# Patient Record
Sex: MALE | Race: WHITE | NOT HISPANIC OR LATINO | Employment: OTHER | URBAN - METROPOLITAN AREA
[De-identification: names, ages, dates, MRNs, and addresses within clinical notes are randomized per-mention and may not be internally consistent; named-entity substitution may affect disease eponyms.]

---

## 2017-06-30 ENCOUNTER — OFFICE VISIT (OUTPATIENT)
Dept: CARDIOLOGY | Facility: CLINIC | Age: 72
End: 2017-06-30

## 2017-06-30 VITALS
HEART RATE: 51 BPM | BODY MASS INDEX: 28 KG/M2 | DIASTOLIC BLOOD PRESSURE: 88 MMHG | SYSTOLIC BLOOD PRESSURE: 124 MMHG | WEIGHT: 200 LBS | HEIGHT: 71 IN

## 2017-06-30 DIAGNOSIS — Z95.2 S/P AORTIC VALVE REPLACEMENT: Primary | ICD-10-CM

## 2017-06-30 PROCEDURE — 99213 OFFICE O/P EST LOW 20 MIN: CPT | Performed by: INTERNAL MEDICINE

## 2017-06-30 PROCEDURE — 93000 ELECTROCARDIOGRAM COMPLETE: CPT | Performed by: INTERNAL MEDICINE

## 2018-07-09 ENCOUNTER — OFFICE VISIT (OUTPATIENT)
Dept: CARDIOLOGY | Facility: CLINIC | Age: 73
End: 2018-07-09

## 2018-07-09 VITALS
BODY MASS INDEX: 28 KG/M2 | SYSTOLIC BLOOD PRESSURE: 120 MMHG | WEIGHT: 200 LBS | DIASTOLIC BLOOD PRESSURE: 90 MMHG | HEART RATE: 55 BPM | HEIGHT: 71 IN

## 2018-07-09 DIAGNOSIS — Z95.2 S/P AORTIC VALVE REPLACEMENT: Primary | ICD-10-CM

## 2018-07-09 DIAGNOSIS — I33.0 BACTERIAL ENDOCARDITIS, UNSPECIFIED CHRONICITY: ICD-10-CM

## 2018-07-09 PROCEDURE — 93000 ELECTROCARDIOGRAM COMPLETE: CPT | Performed by: INTERNAL MEDICINE

## 2018-07-09 PROCEDURE — 99214 OFFICE O/P EST MOD 30 MIN: CPT | Performed by: INTERNAL MEDICINE

## 2018-07-09 NOTE — PROGRESS NOTES
Date of Office Visit: 2017  Encounter Provider: Yanick Reza MD  Place of Service: [facility] CARDIOLOGY  Patient Name: Kem Giraldo  :1945  [id number]    Chief Complaint   Patient presents with   • Cardiac Valve Problem   :     HPI: Kem Giraldo is a 72 y.o. male  this is a cayden that got endocarditis on his aortic valve from olecranon bursitis in  he is a #25 porcine aortic valve replacement by Dr. Haley no coronary disease preoperatively normal LV function.  He is an avid  and exerciser and still feels great.  He has no shortness of breath, paroxysmal nocturnal dyspnea, orthopnea, or edema.  Sadly, his wife has become quite demented and is now bed-bound and he is the primary caregiver for her but, otherwise, there has been no significant change in his status.          Past Medical History:   Diagnosis Date   • Acute renal failure (CMS/HCC)    • Acute respiratory failure (CMS/HCC)    • Adult respiratory distress syndrome (CMS/HCC)    • Aortic valve endocarditis      from elbow bursitis   • Endocarditis of mitral valve    • Esophageal reflux    • Fasciitis     right arm   • Hyperlipidemia    • Hypertension    • Hypothyroidism    • Olecranon bursitis     Right   • Osteoarthritis    • Pericarditis    • Postop septic pulmonary embolism (CMS/HCC)     sepsis from traumatic olecranon bursitis, spread to inner arm fasciitis and spinal cord abcess   • Pyelonephritis        Past Surgical History:   Procedure Laterality Date   • AORTIC VALVE REPAIR/REPLACEMENT      #25 Porcine AVR by Dr Haley at time of BE; normal cors pre-op   • CREATION PERICARDIAL WINDOW     • DECOMPRESSION FASCIOTOMY FOREARM     • GTUBE INSERTION     • INCISION AND DRAINAGE ABSCESS POSTERIOR THORACIC SPINE         Social History     Social History   • Marital status:      Spouse name: N/A   • Number of children: N/A   • Years of education: N/A     Occupational History   • Not on file.      Social History Main Topics   • Smoking status: Never Smoker   • Smokeless tobacco: Not on file      Comment: caffeine use   • Alcohol use Yes   • Drug use: Unknown   • Sexual activity: Not on file     Other Topics Concern   • Not on file     Social History Narrative   • No narrative on file       No family history on file.    Review of Systems   Constitution: Negative for decreased appetite, fever, malaise/fatigue and weight loss.   HENT: Negative for nosebleeds.    Eyes: Negative for double vision.   Cardiovascular: Negative for chest pain, claudication, cyanosis, dyspnea on exertion, irregular heartbeat, leg swelling, near-syncope, orthopnea, palpitations, paroxysmal nocturnal dyspnea and syncope.   Respiratory: Negative for cough, hemoptysis and shortness of breath.    Hematologic/Lymphatic: Negative for bleeding problem.   Skin: Negative for rash.   Musculoskeletal: Negative for falls and myalgias.   Gastrointestinal: Negative for hematochezia, jaundice, melena, nausea and vomiting.   Genitourinary: Negative for hematuria.   Neurological: Negative for dizziness and seizures.   Psychiatric/Behavioral: Negative for altered mental status and memory loss.       No Known Allergies      Current Outpatient Prescriptions:   •  Ascorbic Acid (VITAMIN C PO), Take by mouth., Disp: , Rfl:   •  aspirin 81 MG tablet, Take 1 tablet by mouth daily., Disp: , Rfl:   •  Cetirizine HCl (ZYRTEC PO), Take by mouth., Disp: , Rfl:   •  chlorthalidone (HYGROTEN) 25 MG tablet, Take 1 tablet by mouth daily., Disp: , Rfl:   •  levothyroxine (SYNTHROID, LEVOTHROID) 125 MCG tablet, Take 125 mcg by mouth daily., Disp: , Rfl:   •  Lutein 6 MG tablet, Take by mouth., Disp: , Rfl:   •  metoprolol tartrate (LOPRESSOR) 50 MG tablet, Take 1 tablet by mouth 2 (two) times a day., Disp: , Rfl:   •  Omeprazole 20 MG tablet delayed-release, Take 1 tablet by mouth daily., Disp: , Rfl:   •  simvastatin (ZOCOR) 40 MG tablet, Take 1 tablet by mouth  "daily., Disp: , Rfl:      Objective:     Vitals:    07/09/18 1055   BP: 120/90   Pulse: 55   Weight: 90.7 kg (200 lb)   Height: 180.3 cm (71\")     Body mass index is 27.89 kg/m².    Physical Exam   Constitutional: He is oriented to person, place, and time. He appears well-developed and well-nourished.   HENT:   Head: Normocephalic.   Eyes: No scleral icterus.   Neck: No JVD present. No thyromegaly present.   Cardiovascular: Normal rate and regular rhythm.  Exam reveals no gallop and no friction rub.    Murmur heard.   Harsh early systolic murmur is present with a grade of 1/6  at the upper right sternal border radiating to the neck  Pulmonary/Chest: Effort normal and breath sounds normal. He has no wheezes. He has no rales.       Abdominal: Soft. There is no hepatosplenomegaly. There is no tenderness.   Musculoskeletal: Normal range of motion. He exhibits no edema.   Lymphadenopathy:     He has no cervical adenopathy.   Neurological: He is alert and oriented to person, place, and time.   Skin: Skin is warm and dry. No rash noted.   Psychiatric: He has a normal mood and affect.         ECG 12 Lead  Date/Time: 7/9/2018 11:46 AM  Performed by: NOEL CHAVEZ  Authorized by: NOEL CHAVEZ   Comparison: compared with previous ECG   Similar to previous ECG  Rhythm: sinus rhythm  Clinical impression: normal ECG             Assessment:       Diagnosis Plan   1. S/P aortic valve replacement            Plan:       This is a gentleman that had an unfortunate complication from olecranon bursitis that led to endocarditis of his aortic valve.  In 2009, Dr. Chris Haley replaced his valve.  He had normal coronaries and normal left ventricular function.  He has done very well since then.  It is a porcine number 25 aortic valve.  In 2016, we did an echocardiogram on his heart and his valve looked great.  His left ventricle looked normal.  He is not having any symptoms now.  I am not going to make any changes.  I am going to have " him come and see GABBY Nicholson in a year and see me in two years.  In two years, for sure, we should get an echocardiogram on him.  If he were to develop any symptoms, we would do something sooner.         As always, it has been a pleasure to participate in your patient's care.      Sincerely,       Yanick Reza MD

## 2019-07-16 ENCOUNTER — OFFICE VISIT (OUTPATIENT)
Dept: CARDIOLOGY | Facility: CLINIC | Age: 74
End: 2019-07-16

## 2019-07-16 VITALS
HEART RATE: 60 BPM | DIASTOLIC BLOOD PRESSURE: 76 MMHG | SYSTOLIC BLOOD PRESSURE: 140 MMHG | OXYGEN SATURATION: 99 % | BODY MASS INDEX: 29.4 KG/M2 | HEIGHT: 71 IN | WEIGHT: 210 LBS

## 2019-07-16 DIAGNOSIS — Z95.2 S/P AORTIC VALVE REPLACEMENT: Primary | ICD-10-CM

## 2019-07-16 PROCEDURE — 99213 OFFICE O/P EST LOW 20 MIN: CPT | Performed by: NURSE PRACTITIONER

## 2019-07-16 PROCEDURE — 93000 ELECTROCARDIOGRAM COMPLETE: CPT | Performed by: NURSE PRACTITIONER

## 2019-07-16 RX ORDER — POTASSIUM CHLORIDE 1500 MG/1
1 TABLET, EXTENDED RELEASE ORAL DAILY
COMMUNITY
Start: 2019-07-10

## 2019-07-16 NOTE — PROGRESS NOTES
Date of Office Visit: 2019  Encounter Provider: CALISTA Evans  Place of Service: [facility] CARDIOLOGY  Patient Name: Kem Giraldo  :1945    Chief Complaint   Patient presents with   • S/P aortic valve replacement     1 yr f/u   :     HPI: Kem Giraldo is a 73 y.o. male, new to me, who presents today for follow-up.  Old records have been obtained and reviewed by me.  He is a patient of Dr. Fermin with a past cardiac history significant for hypertension, hyperlipidemia, and aortic valve replacement.  In , he had endocarditis of the aortic valve from olecranon bursitis.  He has no history of coronary disease and has normal LV function.  His last echocardiogram was in 2016 which revealed normal LV function and an estimated EF of 59%.  He was last seen in the office on 2018 at which time he was doing well with no complaints of angina or heart failure.  No changes were made to his medical regimen and he was advised to follow-up in 1 year.   Over the past year he has been doing well from a cardiac standpoint.  He denies any chest pain, shortness of air, palpitations, edema, dizziness, or syncope.  Evidently he was experiencing a little bit of fatigue and shortness of breath recently.  His PCP discovered that he had low potassium and started him on potassium replacement.  His symptoms have all resolved.  He works out 3 days a week at his home gym and walks twice a week with friends.  He is able to do these activities without any difficulty.  Sadly he lost his wife about 2 months ago after a long nix with dementia.    Past Medical History:   Diagnosis Date   • Acute renal failure (CMS/HCC)    • Acute respiratory failure (CMS/HCC)    • Adult respiratory distress syndrome (CMS/HCC)    • Aortic valve endocarditis      from elbow bursitis   • Endocarditis of mitral valve    • Esophageal reflux    • Fasciitis     right arm   • Hyperlipidemia    •  Hypertension    • Hypothyroidism    • Olecranon bursitis     Right   • Osteoarthritis    • Pericarditis    • Postop septic pulmonary embolism (CMS/HCC)     sepsis from traumatic olecranon bursitis, spread to inner arm fasciitis and spinal cord abcess   • Pyelonephritis        Past Surgical History:   Procedure Laterality Date   • AORTIC VALVE REPAIR/REPLACEMENT      #25 Porcine AVR by Dr Haley at time of BE; normal cors pre-op   • CREATION PERICARDIAL WINDOW     • DECOMPRESSION FASCIOTOMY FOREARM     • GTUBE INSERTION     • INCISION AND DRAINAGE ABSCESS POSTERIOR THORACIC SPINE         Social History     Socioeconomic History   • Marital status:      Spouse name: Not on file   • Number of children: Not on file   • Years of education: Not on file   • Highest education level: Not on file   Tobacco Use   • Smoking status: Never Smoker   • Smokeless tobacco: Never Used   • Tobacco comment: caffeine use   Substance and Sexual Activity   • Alcohol use: Yes     Alcohol/week: 0.6 oz     Types: 1 Cans of beer per week     Comment: social    • Sexual activity: Not Currently     Comment:        Family History   Problem Relation Age of Onset   • Hypertension Mother    • Hyperlipidemia Mother    • Hypertension Father    • Hyperlipidemia Father    • Cancer Father        Review of Systems   Constitution: Negative for chills, fever and malaise/fatigue.   Cardiovascular: Negative for chest pain, dyspnea on exertion, leg swelling, near-syncope, orthopnea, palpitations, paroxysmal nocturnal dyspnea and syncope.   Respiratory: Negative for cough and shortness of breath.    Musculoskeletal: Negative for joint pain, joint swelling and myalgias.   Gastrointestinal: Negative for abdominal pain, diarrhea, melena, nausea and vomiting.   Genitourinary: Negative for frequency and hematuria.   Neurological: Negative for light-headedness, numbness, paresthesias and seizures.   Allergic/Immunologic: Negative.    All other systems  "reviewed and are negative.      No Known Allergies      Current Outpatient Medications:   •  Ascorbic Acid (VITAMIN C PO), Take 1 tablet by mouth Daily., Disp: , Rfl:   •  aspirin 81 MG tablet, Take 1 tablet by mouth daily., Disp: , Rfl:   •  Cetirizine HCl (ZYRTEC PO), Take 1 tablet by mouth Daily., Disp: , Rfl:   •  chlorthalidone (HYGROTEN) 25 MG tablet, Take 1 tablet by mouth daily., Disp: , Rfl:   •  KLOR-CON 20 MEQ CR tablet, Take 1 tablet by mouth Daily., Disp: , Rfl:   •  levothyroxine (SYNTHROID, LEVOTHROID) 125 MCG tablet, Take 125 mcg by mouth daily., Disp: , Rfl:   •  Lutein 6 MG tablet, Take 1 tablet by mouth Daily., Disp: , Rfl:   •  metoprolol tartrate (LOPRESSOR) 50 MG tablet, Take 1 tablet by mouth Daily., Disp: , Rfl:   •  Omeprazole 20 MG tablet delayed-release, Take 1 tablet by mouth daily., Disp: , Rfl:       Objective:     Vitals:    07/16/19 1149 07/16/19 1150   BP: 142/86 140/76   BP Location: Right arm Left arm   Pulse: 60    SpO2: 99%    Weight: 95.3 kg (210 lb)    Height: 180.3 cm (71\")      Body mass index is 29.29 kg/m².    PHYSICAL EXAM:    Physical Exam   Constitutional: He is oriented to person, place, and time. He appears well-developed and well-nourished. No distress.   HENT:   Head: Normocephalic and atraumatic.   Eyes: Pupils are equal, round, and reactive to light.   Neck: No JVD present. No thyromegaly present.   Cardiovascular: Normal rate, regular rhythm, normal heart sounds and intact distal pulses.   No murmur heard.  Pulmonary/Chest: Effort normal and breath sounds normal. No respiratory distress.   Abdominal: Soft. Bowel sounds are normal. He exhibits no distension. There is no splenomegaly or hepatomegaly. There is no tenderness.   Musculoskeletal: Normal range of motion. He exhibits no edema.   Neurological: He is alert and oriented to person, place, and time.   Skin: Skin is warm and dry. He is not diaphoretic. No erythema.   Psychiatric: He has a normal mood and " affect. His behavior is normal. Judgment normal.         ECG 12 Lead  Date/Time: 7/16/2019 11:59 AM  Performed by: Nina Khan APRN  Authorized by: Nina Khan APRN   Comparison: compared with previous ECG from 7/9/2018  Similar to previous ECG  Rhythm: sinus bradycardia  Rate: bradycardic  BPM: 56  Conduction: 1st degree AV block  T inversion: V1    Clinical impression: abnormal EKG  Comments: Indication: Aortic valve replacement              Assessment:       Diagnosis Plan   1. S/P aortic valve replacement  ECG 12 Lead     Orders Placed This Encounter   Procedures   • ECG 12 Lead     This order was created via procedure documentation          Plan:       1. S/p aortic valve replacement.  Overall I think he is stable.  He denies any symptoms of heart failure.  Per Dr. Reza's recommendation last year, we will repeat an echo in 1 year or sooner if symptoms warrant.  I am not going to make any changes to his medical regimen and he will follow-up with Dr. Reza in 1 year or sooner if needed.      As always, it has been a pleasure to participate in your patient's care.      Sincerely,         CALISTA Saldivar

## 2020-07-16 ENCOUNTER — OFFICE VISIT (OUTPATIENT)
Dept: CARDIOLOGY | Facility: CLINIC | Age: 75
End: 2020-07-16

## 2020-07-16 VITALS
SYSTOLIC BLOOD PRESSURE: 132 MMHG | BODY MASS INDEX: 30.21 KG/M2 | HEART RATE: 57 BPM | HEIGHT: 70 IN | WEIGHT: 211 LBS | DIASTOLIC BLOOD PRESSURE: 90 MMHG

## 2020-07-16 DIAGNOSIS — Z95.2 S/P AORTIC VALVE REPLACEMENT: ICD-10-CM

## 2020-07-16 DIAGNOSIS — I33.0 BACTERIAL ENDOCARDITIS, UNSPECIFIED CHRONICITY: Primary | ICD-10-CM

## 2020-07-16 PROCEDURE — 93000 ELECTROCARDIOGRAM COMPLETE: CPT | Performed by: INTERNAL MEDICINE

## 2020-07-16 PROCEDURE — 99214 OFFICE O/P EST MOD 30 MIN: CPT | Performed by: INTERNAL MEDICINE

## 2020-07-16 NOTE — PROGRESS NOTES
Date of Office Visit: 20  Encounter Provider: Yanick Reza MD  Place of Service: [facility] CARDIOLOGY  Patient Name: Kem Giraldo  :1945  [id number]    Chief Complaint   Patient presents with   • Cardiac Valve Problem   :     HPI: Kem Giraldo is a 74 y.o. male  this is a cayden that got endocarditis on his aortic valve from olecranon bursitis in  he is a #25 porcine aortic valve replacement by Dr. Haley no coronary disease preoperatively normal LV function.    He has been doing pretty well.  He does not have any fever chills sweats no cough no shortness of breath he still works out all the time matter fact his front room is a gym.  Sadly his wife  last year she had dementia    Past Medical History:   Diagnosis Date   • Acute renal failure (CMS/HCC)    • Acute respiratory failure (CMS/HCC)    • Adult respiratory distress syndrome (CMS/HCC)    • Aortic valve endocarditis      from elbow bursitis   • Endocarditis of mitral valve    • Esophageal reflux    • Fasciitis     right arm   • Hyperlipidemia    • Hypertension    • Hypothyroidism    • Olecranon bursitis     Right   • Osteoarthritis    • Pericarditis    • Postop septic pulmonary embolism (CMS/HCC)     sepsis from traumatic olecranon bursitis, spread to inner arm fasciitis and spinal cord abcess   • Pyelonephritis        Past Surgical History:   Procedure Laterality Date   • AORTIC VALVE REPAIR/REPLACEMENT      #25 Porcine AVR by Dr Haley at time of BE; normal cors pre-op   • CREATION PERICARDIAL WINDOW     • DECOMPRESSION FASCIOTOMY FOREARM     • GTUBE INSERTION     • INCISION AND DRAINAGE ABSCESS POSTERIOR THORACIC SPINE         Social History     Socioeconomic History   • Marital status:      Spouse name: Not on file   • Number of children: Not on file   • Years of education: Not on file   • Highest education level: Not on file   Tobacco Use   • Smoking status: Never Smoker   • Smokeless  tobacco: Never Used   • Tobacco comment: caffeine use   Substance and Sexual Activity   • Alcohol use: Yes     Alcohol/week: 1.0 standard drinks     Types: 1 Cans of beer per week     Comment: social    • Sexual activity: Not Currently     Comment:        Family History   Problem Relation Age of Onset   • Hypertension Mother    • Hyperlipidemia Mother    • Hypertension Father    • Hyperlipidemia Father    • Cancer Father        Review of Systems   Constitution: Negative for decreased appetite, fever, malaise/fatigue and weight loss.   HENT: Negative for nosebleeds.    Eyes: Negative for double vision.   Cardiovascular: Negative for chest pain, claudication, cyanosis, dyspnea on exertion, irregular heartbeat, leg swelling, near-syncope, orthopnea, palpitations, paroxysmal nocturnal dyspnea and syncope.   Respiratory: Negative for cough, hemoptysis and shortness of breath.    Hematologic/Lymphatic: Negative for bleeding problem.   Skin: Negative for rash.   Musculoskeletal: Negative for falls and myalgias.   Gastrointestinal: Negative for hematochezia, jaundice, melena, nausea and vomiting.   Genitourinary: Negative for hematuria.   Neurological: Negative for dizziness and seizures.   Psychiatric/Behavioral: Negative for altered mental status and memory loss.       No Known Allergies      Current Outpatient Medications:   •  Ascorbic Acid (VITAMIN C PO), Take 1 tablet by mouth Daily., Disp: , Rfl:   •  aspirin 81 MG tablet, Take 1 tablet by mouth daily., Disp: , Rfl:   •  Cetirizine HCl (ZYRTEC PO), Take 1 tablet by mouth Daily., Disp: , Rfl:   •  chlorthalidone (HYGROTEN) 25 MG tablet, Take 1 tablet by mouth daily., Disp: , Rfl:   •  KLOR-CON 20 MEQ CR tablet, Take 1 tablet by mouth Daily., Disp: , Rfl:   •  levothyroxine (SYNTHROID, LEVOTHROID) 125 MCG tablet, Take 125 mcg by mouth daily., Disp: , Rfl:   •  Lutein 6 MG tablet, Take 1 tablet by mouth Daily., Disp: , Rfl:   •  metoprolol tartrate (LOPRESSOR) 50  "MG tablet, Take 1 tablet by mouth Daily., Disp: , Rfl:   •  Omeprazole 20 MG tablet delayed-release, Take 1 tablet by mouth daily., Disp: , Rfl:       Objective:     Vitals:    07/16/20 1119   BP: 132/90   Pulse: 57   Weight: 95.7 kg (211 lb)   Height: 177.8 cm (70\")     Body mass index is 30.28 kg/m².    Physical Exam   Constitutional: He is oriented to person, place, and time. He appears well-developed and well-nourished.   HENT:   Head: Normocephalic.   Eyes: No scleral icterus.   Neck: No JVD present. No thyromegaly present.   Cardiovascular: Normal rate, regular rhythm and normal heart sounds. Exam reveals no gallop and no friction rub.   No murmur heard.  Pulmonary/Chest: Effort normal and breath sounds normal. He has no wheezes. He has no rales.       Abdominal: Soft. There is no hepatosplenomegaly. There is no tenderness.   Musculoskeletal: Normal range of motion. He exhibits no edema.   Lymphadenopathy:     He has no cervical adenopathy.   Neurological: He is alert and oriented to person, place, and time.   Skin: Skin is warm and dry. No rash noted.   Psychiatric: He has a normal mood and affect.         ECG 12 Lead  Date/Time: 7/16/2020 11:43 AM  Performed by: Yanick Reza MD  Authorized by: Yanick Reza MD   Comparison: compared with previous ECG   Similar to previous ECG  Rhythm: sinus rhythm    Clinical impression: normal ECG             Assessment:       Diagnosis Plan   1. Bacterial endocarditis, unspecified chronicity     2. S/P aortic valve replacement            Plan:       I think the rate is doing well we have not done an echo on him in a while his valves been in for 9years sounds great and he is asymptomatic I would think if he developed a problem in the future we would probably do a valve and valve TAVR I do not know what kind of valve he has although I know it is a #25 I would imagine at some mosaic but I do not know.  I will have him come back and see Merry or Nina in a year and see " me in 2    As always, it has been a pleasure to participate in your patient's care.      Sincerely,       Yanick Reza MD

## 2020-07-30 ENCOUNTER — HOSPITAL ENCOUNTER (OUTPATIENT)
Dept: CARDIOLOGY | Facility: HOSPITAL | Age: 75
Discharge: HOME | End: 2020-07-30
Admitting: INTERNAL MEDICINE

## 2020-07-30 VITALS
HEIGHT: 70 IN | BODY MASS INDEX: 30.21 KG/M2 | DIASTOLIC BLOOD PRESSURE: 90 MMHG | SYSTOLIC BLOOD PRESSURE: 180 MMHG | HEART RATE: 61 BPM | WEIGHT: 211 LBS

## 2020-07-30 DIAGNOSIS — I33.0 BACTERIAL ENDOCARDITIS, UNSPECIFIED CHRONICITY: ICD-10-CM

## 2020-07-30 DIAGNOSIS — Z95.2 S/P AORTIC VALVE REPLACEMENT: ICD-10-CM

## 2020-07-30 PROCEDURE — 93306 TTE W/DOPPLER COMPLETE: CPT | Performed by: INTERNAL MEDICINE

## 2020-07-30 PROCEDURE — 93306 TTE W/DOPPLER COMPLETE: CPT

## 2020-07-30 PROCEDURE — 93356 MYOCRD STRAIN IMG SPCKL TRCK: CPT | Performed by: INTERNAL MEDICINE

## 2020-07-30 PROCEDURE — 93356 MYOCRD STRAIN IMG SPCKL TRCK: CPT

## 2020-08-04 LAB
AORTIC ARCH: 2.5 CM
AORTIC DIMENSIONLESS INDEX: 0.4 (DI)
ASCENDING AORTA: 3.5 CM
BH CV ECHO MEAS - AO ARCH DIAM (PROXIMAL TRANS.): 2.5 CM
BH CV ECHO MEAS - AO MAX PG (FULL): 18.8 MMHG
BH CV ECHO MEAS - AO MAX PG: 22.1 MMHG
BH CV ECHO MEAS - AO MEAN PG (FULL): 10.5 MMHG
BH CV ECHO MEAS - AO MEAN PG: 12.3 MMHG
BH CV ECHO MEAS - AO ROOT AREA (BSA CORRECTED): 1.3
BH CV ECHO MEAS - AO ROOT AREA: 6.4 CM^2
BH CV ECHO MEAS - AO ROOT DIAM: 2.9 CM
BH CV ECHO MEAS - AO V2 MAX: 234.8 CM/SEC
BH CV ECHO MEAS - AO V2 MEAN: 165.9 CM/SEC
BH CV ECHO MEAS - AO V2 VTI: 52.6 CM
BH CV ECHO MEAS - ASC AORTA: 3.5 CM
BH CV ECHO MEAS - AVA(I,A): 1.4 CM^2
BH CV ECHO MEAS - AVA(I,D): 1.4 CM^2
BH CV ECHO MEAS - AVA(V,A): 1.2 CM^2
BH CV ECHO MEAS - AVA(V,D): 1.2 CM^2
BH CV ECHO MEAS - BSA(HAYCOCK): 2.2 M^2
BH CV ECHO MEAS - BSA: 2.1 M^2
BH CV ECHO MEAS - BZI_BMI: 30.3 KILOGRAMS/M^2
BH CV ECHO MEAS - BZI_METRIC_HEIGHT: 177.8 CM
BH CV ECHO MEAS - BZI_METRIC_WEIGHT: 95.7 KG
BH CV ECHO MEAS - EDV(MOD-SP4): 97 ML
BH CV ECHO MEAS - EDV(TEICH): 56.9 ML
BH CV ECHO MEAS - EF(CUBED): 82.2 %
BH CV ECHO MEAS - EF(MOD-BP): 57 %
BH CV ECHO MEAS - EF(MOD-SP4): 56.7 %
BH CV ECHO MEAS - EF(TEICH): 75.8 %
BH CV ECHO MEAS - ESV(MOD-SP4): 42 ML
BH CV ECHO MEAS - ESV(TEICH): 13.8 ML
BH CV ECHO MEAS - FS: 43.8 %
BH CV ECHO MEAS - IVS/LVPW: 1.3
BH CV ECHO MEAS - IVSD: 1.7 CM
BH CV ECHO MEAS - LAT PEAK E' VEL: 8.9 CM/SEC
BH CV ECHO MEAS - LV DIASTOLIC VOL/BSA (35-75): 45.4 ML/M^2
BH CV ECHO MEAS - LV MASS(C)D: 204.9 GRAMS
BH CV ECHO MEAS - LV MASS(C)DI: 95.9 GRAMS/M^2
BH CV ECHO MEAS - LV MAX PG: 3.2 MMHG
BH CV ECHO MEAS - LV MEAN PG: 1.8 MMHG
BH CV ECHO MEAS - LV SYSTOLIC VOL/BSA (12-30): 19.7 ML/M^2
BH CV ECHO MEAS - LV V1 MAX: 90 CM/SEC
BH CV ECHO MEAS - LV V1 MEAN: 64.2 CM/SEC
BH CV ECHO MEAS - LV V1 VTI: 23.5 CM
BH CV ECHO MEAS - LVIDD: 3.7 CM
BH CV ECHO MEAS - LVIDS: 2.1 CM
BH CV ECHO MEAS - LVLD AP4: 9.2 CM
BH CV ECHO MEAS - LVLS AP4: 7.8 CM
BH CV ECHO MEAS - LVOT AREA (M): 3.1 CM^2
BH CV ECHO MEAS - LVOT AREA: 3 CM^2
BH CV ECHO MEAS - LVOT DIAM: 2 CM
BH CV ECHO MEAS - LVPWD: 1.3 CM
BH CV ECHO MEAS - MED PEAK E' VEL: 5.5 CM/SEC
BH CV ECHO MEAS - MR MAX PG: 32.5 MMHG
BH CV ECHO MEAS - MR MAX VEL: 284.9 CM/SEC
BH CV ECHO MEAS - MV A DUR: 0.15 SEC
BH CV ECHO MEAS - MV A MAX VEL: 73.3 CM/SEC
BH CV ECHO MEAS - MV DEC SLOPE: 196.3 CM/SEC^2
BH CV ECHO MEAS - MV DEC TIME: 0.37 SEC
BH CV ECHO MEAS - MV E MAX VEL: 46.7 CM/SEC
BH CV ECHO MEAS - MV E/A: 0.64
BH CV ECHO MEAS - MV MAX PG: 2.3 MMHG
BH CV ECHO MEAS - MV MEAN PG: 1.1 MMHG
BH CV ECHO MEAS - MV P1/2T MAX VEL: 61.1 CM/SEC
BH CV ECHO MEAS - MV P1/2T: 91.2 MSEC
BH CV ECHO MEAS - MV V2 MAX: 76 CM/SEC
BH CV ECHO MEAS - MV V2 MEAN: 48.4 CM/SEC
BH CV ECHO MEAS - MV V2 VTI: 26.6 CM
BH CV ECHO MEAS - MVA P1/2T LCG: 3.6 CM^2
BH CV ECHO MEAS - MVA(P1/2T): 2.4 CM^2
BH CV ECHO MEAS - MVA(VTI): 2.7 CM^2
BH CV ECHO MEAS - PA ACC TIME: 0.11 SEC
BH CV ECHO MEAS - PA MAX PG (FULL): 2.3 MMHG
BH CV ECHO MEAS - PA MAX PG: 3.7 MMHG
BH CV ECHO MEAS - PA PR(ACCEL): 30.3 MMHG
BH CV ECHO MEAS - PA V2 MAX: 96.4 CM/SEC
BH CV ECHO MEAS - PULM A REVS DUR: 0.13 SEC
BH CV ECHO MEAS - PULM A REVS VEL: 29.6 CM/SEC
BH CV ECHO MEAS - PULM DIAS VEL: 36.4 CM/SEC
BH CV ECHO MEAS - PULM S/D: 1.1
BH CV ECHO MEAS - PULM SYS VEL: 41.6 CM/SEC
BH CV ECHO MEAS - PVA(V,A): 2.2 CM^2
BH CV ECHO MEAS - PVA(V,D): 2.2 CM^2
BH CV ECHO MEAS - QP/QS: 0.68
BH CV ECHO MEAS - RAP SYSTOLE: 3 MMHG
BH CV ECHO MEAS - RV MAX PG: 1.4 MMHG
BH CV ECHO MEAS - RV MEAN PG: 0.83 MMHG
BH CV ECHO MEAS - RV V1 MAX: 58.7 CM/SEC
BH CV ECHO MEAS - RV V1 MEAN: 44.3 CM/SEC
BH CV ECHO MEAS - RV V1 VTI: 13.7 CM
BH CV ECHO MEAS - RVOT AREA: 3.6 CM^2
BH CV ECHO MEAS - RVOT DIAM: 2.1 CM
BH CV ECHO MEAS - RVSP: 27 MMHG
BH CV ECHO MEAS - SI(AO): 158.4 ML/M^2
BH CV ECHO MEAS - SI(CUBED): 19 ML/M^2
BH CV ECHO MEAS - SI(LVOT): 33.3 ML/M^2
BH CV ECHO MEAS - SI(MOD-SP4): 25.8 ML/M^2
BH CV ECHO MEAS - SI(TEICH): 20.2 ML/M^2
BH CV ECHO MEAS - SUP REN AO DIAM: 2.8 CM
BH CV ECHO MEAS - SV(AO): 338.3 ML
BH CV ECHO MEAS - SV(CUBED): 40.6 ML
BH CV ECHO MEAS - SV(LVOT): 71.2 ML
BH CV ECHO MEAS - SV(MOD-SP4): 55 ML
BH CV ECHO MEAS - SV(RVOT): 48.6 ML
BH CV ECHO MEAS - SV(TEICH): 43.1 ML
BH CV ECHO MEAS - TAPSE (>1.6): 1.4 CM
BH CV ECHO MEAS - TR MAX VEL: 243 CM/SEC
BH CV ECHO MEASUREMENTS AVERAGE E/E' RATIO: 6.49
BH CV XLRA - RV BASE: 3.6 CM
BH CV XLRA - RV LENGTH: 5.7 CM
BH CV XLRA - RV MID: 2.5 CM
BH CV XLRA - TDI S': 10 CM/SEC
LEFT ATRIUM VOLUME INDEX: 30 ML/M2
LV EF 2D ECHO EST: 57 %
MAXIMAL PREDICTED HEART RATE: 147 BPM
SINUS: 2.9 CM
STJ: 2.9 CM
STRESS TARGET HR: 125 BPM

## 2021-07-16 ENCOUNTER — OFFICE VISIT (OUTPATIENT)
Dept: CARDIOLOGY | Facility: CLINIC | Age: 76
End: 2021-07-16

## 2021-07-16 ENCOUNTER — TELEPHONE (OUTPATIENT)
Dept: CARDIOLOGY | Facility: CLINIC | Age: 76
End: 2021-07-16

## 2021-07-16 VITALS
DIASTOLIC BLOOD PRESSURE: 88 MMHG | SYSTOLIC BLOOD PRESSURE: 132 MMHG | OXYGEN SATURATION: 98 % | HEART RATE: 64 BPM | WEIGHT: 208 LBS | BODY MASS INDEX: 29.78 KG/M2 | HEIGHT: 70 IN

## 2021-07-16 DIAGNOSIS — I33.0 BACTERIAL ENDOCARDITIS, UNSPECIFIED CHRONICITY: ICD-10-CM

## 2021-07-16 DIAGNOSIS — I10 ESSENTIAL HYPERTENSION: ICD-10-CM

## 2021-07-16 DIAGNOSIS — Z95.2 S/P AORTIC VALVE REPLACEMENT: Primary | ICD-10-CM

## 2021-07-16 PROCEDURE — 93000 ELECTROCARDIOGRAM COMPLETE: CPT | Performed by: NURSE PRACTITIONER

## 2021-07-16 PROCEDURE — 99214 OFFICE O/P EST MOD 30 MIN: CPT | Performed by: NURSE PRACTITIONER

## 2021-07-16 RX ORDER — MULTIPLE VITAMINS W/ MINERALS TAB 9MG-400MCG
1 TAB ORAL DAILY
COMMUNITY

## 2021-07-16 RX ORDER — SIMVASTATIN 40 MG
TABLET ORAL
COMMUNITY
Start: 2021-07-15

## 2021-07-16 NOTE — TELEPHONE ENCOUNTER
----- Message from CALISTA Daigle sent at 7/16/2021  1:21 PM EDT -----  Please get patients recent BMP, Lipid panel, tsh and cbc from his pcp.

## 2021-07-16 NOTE — PROGRESS NOTES
Date of Office Visit: 2021  Encounter Provider: CALISTA Daigle  Place of Service: [facility]  Patient Name: Kem Giraldo  :1945    Chief Complaint   Patient presents with   • Follow-up   • aortic valve replacement   • Endocarditis   :     HPI: Kem Giraldo is a 75-year-old male who is a patient of Dr. Reza and is new to me today.  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  He underwent a #25 porcine aortic valve replacement by Dr. Haley.  He had no coronary disease and normal LV function at that time.  He presents today for a 1 year follow-up.    He is doing well since we saw him.  He stays very active and exercises regularly.  His blood pressure is little high today but I rechecked it and it is 132/88.  He goes to his family doctor regularly who monitors his lab work routinely and blood pressure.  He says it is never over 120s.  He was recently started on niacin for his cholesterol.  He denies any chest pain, shortness of breath, dizziness or lightheadedness.  Previous testing and notes have been reviewed by me.   Past Medical History:   Diagnosis Date   • Acute renal failure (CMS/HCC)    • Acute respiratory failure (CMS/HCC)    • Adult respiratory distress syndrome (CMS/HCC)    • Aortic valve endocarditis      from elbow bursitis   • Endocarditis of mitral valve    • Esophageal reflux    • Fasciitis     right arm   • Hyperlipidemia    • Hypertension    • Hypothyroidism    • Olecranon bursitis     Right   • Osteoarthritis    • Pericarditis    • Postop septic pulmonary embolism (CMS/HCC)     sepsis from traumatic olecranon bursitis, spread to inner arm fasciitis and spinal cord abcess   • Pyelonephritis        Past Surgical History:   Procedure Laterality Date   • AORTIC VALVE REPAIR/REPLACEMENT      #25 Porcine AVR by Dr Haley at time of BE; normal cors pre-op   • CREATION PERICARDIAL WINDOW      • DECOMPRESSION FASCIOTOMY FOREARM     • GTUBE INSERTION     • INCISION AND DRAINAGE ABSCESS POSTERIOR THORACIC SPINE         Social History     Socioeconomic History   • Marital status:      Spouse name: Not on file   • Number of children: Not on file   • Years of education: Not on file   • Highest education level: Not on file   Tobacco Use   • Smoking status: Never Smoker   • Smokeless tobacco: Never Used   • Tobacco comment: caffeine use   Substance and Sexual Activity   • Alcohol use: Yes     Alcohol/week: 1.0 standard drinks     Types: 1 Cans of beer per week     Comment: social    • Sexual activity: Not Currently     Comment:        Family History   Problem Relation Age of Onset   • Hypertension Mother    • Hyperlipidemia Mother    • Hypertension Father    • Hyperlipidemia Father    • Cancer Father        Review of Systems   Constitutional: Negative for diaphoresis and malaise/fatigue.   Cardiovascular: Negative for chest pain, claudication, dyspnea on exertion, irregular heartbeat, leg swelling, near-syncope, orthopnea, palpitations, paroxysmal nocturnal dyspnea and syncope.   Respiratory: Negative for cough, shortness of breath and sleep disturbances due to breathing.    Musculoskeletal: Negative for falls.   Neurological: Negative for dizziness and weakness.   Psychiatric/Behavioral: Negative for altered mental status and substance abuse.       No Known Allergies      Current Outpatient Medications:   •  aspirin 81 MG tablet, Take 1 tablet by mouth daily., Disp: , Rfl:   •  Cetirizine HCl (ZYRTEC PO), Take 1 tablet by mouth Daily., Disp: , Rfl:   •  chlorthalidone (HYGROTEN) 25 MG tablet, Take 1 tablet by mouth daily., Disp: , Rfl:   •  KLOR-CON 20 MEQ CR tablet, Take 1 tablet by mouth Daily., Disp: , Rfl:   •  levothyroxine (SYNTHROID, LEVOTHROID) 125 MCG tablet, Take 125 mcg by mouth daily., Disp: , Rfl:   •  Lutein 6 MG tablet, Take 1 tablet by mouth Daily., Disp: , Rfl:   •  metoprolol  "tartrate (LOPRESSOR) 50 MG tablet, Take 1 tablet by mouth Daily., Disp: , Rfl:   •  multivitamin with minerals tablet tablet, Take 1 tablet by mouth Daily., Disp: , Rfl:   •  NIACIN CR PO, Take  by mouth., Disp: , Rfl:   •  Omeprazole 20 MG tablet delayed-release, Take 1 tablet by mouth daily., Disp: , Rfl:   •  simvastatin (ZOCOR) 40 MG tablet, , Disp: , Rfl:   •  Ascorbic Acid (VITAMIN C PO), Take 1 tablet by mouth Daily., Disp: , Rfl:       Objective:     Vitals:    07/16/21 1254 07/16/21 1319   BP: 148/98 132/88   Pulse: 64    SpO2: 98%    Weight: 94.3 kg (208 lb)    Height: 177.8 cm (70\")      Body mass index is 29.84 kg/m².    PHYSICAL EXAM:    Constitutional:       General: Not in acute distress.     Appearance: Normal appearance. Well-developed.   Eyes:      Pupils: Pupils are equal, round, and reactive to light.   HENT:      Head: Normocephalic.   Neck:      Vascular: No carotid bruit or JVD.   Pulmonary:      Effort: Pulmonary effort is normal. No tachypnea.      Breath sounds: Normal breath sounds. No wheezing. No rales.   Cardiovascular:      Normal rate. Regular rhythm.      No gallop.   Pulses:     Intact distal pulses.   Abdominal:      General: Bowel sounds are normal.      Palpations: Abdomen is soft.      Tenderness: There is no abdominal tenderness.   Musculoskeletal: Normal range of motion.      Cervical back: Normal range of motion and neck supple. No edema. Skin:     General: Skin is warm and dry.   Neurological:      Mental Status: Alert and oriented to person, place, and time.           ECG 12 Lead    Date/Time: 7/16/2021 1:36 PM  Performed by: Merry Max APRN  Authorized by: Merry Max APRN   Comparison: compared with previous ECG from 7/16/2020  Similar to previous ECG  Rhythm: sinus rhythm  Rate: normal  Conduction: 1st degree AV block  QRS axis: normal    Clinical impression: normal ECG              Assessment:       Diagnosis Plan   1. S/P aortic valve replacement     2. " Bacterial endocarditis, unspecified chronicity     3. Essential hypertension       Orders Placed This Encounter   Procedures   • ECG 12 Lead     This order was created via procedure documentation     Order Specific Question:   Release to patient     Answer:   Immediate          Plan:       I am not can make any changes today.  I told him to monitor his blood pressure closely at home if it is consistently running over 140/90 to give us a call we may need to go back up on his metoprolol to twice a day.  Otherwise we will see him back in 1 year.         Your medication list          Accurate as of July 16, 2021  1:36 PM. If you have any questions, ask your nurse or doctor.            CONTINUE taking these medications      Instructions Last Dose Given Next Dose Due   aspirin 81 MG tablet      Take 1 tablet by mouth daily.       chlorthalidone 25 MG tablet  Commonly known as: HYGROTON      Take 1 tablet by mouth daily.       KLOR-CON 20 MEQ CR tablet  Generic drug: potassium chloride      Take 1 tablet by mouth Daily.       levothyroxine 125 MCG tablet  Commonly known as: SYNTHROID, LEVOTHROID      Take 125 mcg by mouth daily.       Lutein 6 MG tablet      Take 1 tablet by mouth Daily.       metoprolol tartrate 50 MG tablet  Commonly known as: LOPRESSOR      Take 1 tablet by mouth Daily.       multivitamin with minerals tablet tablet      Take 1 tablet by mouth Daily.       NIACIN CR PO      Take  by mouth.       Omeprazole 20 MG tablet delayed-release      Take 1 tablet by mouth daily.       simvastatin 40 MG tablet  Commonly known as: ZOCOR           VITAMIN C PO      Take 1 tablet by mouth Daily.       ZYRTEC PO      Take 1 tablet by mouth Daily.                As always, it has been a pleasure to participate in your patient's care.      Sincerely,     Merry GRIGSBY

## 2022-08-10 ENCOUNTER — OFFICE VISIT (OUTPATIENT)
Dept: CARDIOLOGY | Facility: CLINIC | Age: 77
End: 2022-08-10

## 2022-08-10 VITALS
WEIGHT: 206.4 LBS | HEIGHT: 70 IN | BODY MASS INDEX: 29.55 KG/M2 | HEART RATE: 53 BPM | DIASTOLIC BLOOD PRESSURE: 80 MMHG | SYSTOLIC BLOOD PRESSURE: 114 MMHG

## 2022-08-10 DIAGNOSIS — I33.0 BACTERIAL ENDOCARDITIS, UNSPECIFIED CHRONICITY: Primary | ICD-10-CM

## 2022-08-10 DIAGNOSIS — I10 ESSENTIAL HYPERTENSION: ICD-10-CM

## 2022-08-10 DIAGNOSIS — Z95.2 S/P AORTIC VALVE REPLACEMENT: ICD-10-CM

## 2022-08-10 PROCEDURE — 93000 ELECTROCARDIOGRAM COMPLETE: CPT | Performed by: INTERNAL MEDICINE

## 2022-08-10 PROCEDURE — 99214 OFFICE O/P EST MOD 30 MIN: CPT | Performed by: INTERNAL MEDICINE

## 2022-08-10 RX ORDER — MULTIPLE VITAMINS W/ MINERALS TAB 9MG-400MCG
1 TAB ORAL DAILY
COMMUNITY

## 2022-08-10 NOTE — PROGRESS NOTES
Date of Office Visit: 08/10/22  Encounter Provider: Yanick Reza MD  Place of Service: [facility]  Patient Name: Kem Giraldo  :1945  [id number]    Chief Complaint   Patient presents with   • AORTIC VALVE PROBLEMS   :     HPI: Kem Giraldo is a 76 y.o. male  is a cayden that got endocarditis on his aortic valve from olecranon bursitis in  he is a #25 porcine aortic valve replacement by Dr. Haley no coronary disease preoperatively normal LV function.  Has been doing well he exercises regularly without limitation.  No shortness of breath PND orthopnea no fevers chills or sweats    Past Medical History:   Diagnosis Date   • Acute renal failure (HCC)    • Acute respiratory failure (HCC)    • Adult respiratory distress syndrome (HCC)    • Aortic valve endocarditis      from elbow bursitis   • Endocarditis of mitral valve    • Esophageal reflux    • Fasciitis     right arm   • Hyperlipidemia    • Hypertension    • Hypothyroidism    • Olecranon bursitis     Right   • Osteoarthritis    • Pericarditis    • Postop septic pulmonary embolism (HCC)     sepsis from traumatic olecranon bursitis, spread to inner arm fasciitis and spinal cord abcess   • Pyelonephritis        Past Surgical History:   Procedure Laterality Date   • AORTIC VALVE REPAIR/REPLACEMENT      #25 Porcine AVR by Dr Haley at time of BE; normal cors pre-op   • CREATION PERICARDIAL WINDOW     • DECOMPRESSION FASCIOTOMY FOREARM     • GTUBE INSERTION     • INCISION AND DRAINAGE ABSCESS POSTERIOR THORACIC SPINE         Social History     Socioeconomic History   • Marital status:    Tobacco Use   • Smoking status: Never Smoker   • Smokeless tobacco: Never Used   • Tobacco comment: caffeine use   Substance and Sexual Activity   • Alcohol use: Yes     Alcohol/week: 1.0 standard drink     Types: 1 Cans of beer per week     Comment: social    • Sexual activity: Not Currently     Comment:         Family History   Problem Relation Age of Onset   • Hypertension Mother    • Hyperlipidemia Mother    • Hypertension Father    • Hyperlipidemia Father    • Cancer Father        Review of Systems   Constitutional: Negative for decreased appetite, fever, malaise/fatigue and weight loss.   HENT: Negative for nosebleeds.    Eyes: Negative for double vision.   Cardiovascular: Negative for chest pain, claudication, cyanosis, dyspnea on exertion, irregular heartbeat, leg swelling, near-syncope, orthopnea, palpitations, paroxysmal nocturnal dyspnea and syncope.   Respiratory: Negative for cough, hemoptysis and shortness of breath.    Hematologic/Lymphatic: Negative for bleeding problem.   Skin: Negative for rash.   Musculoskeletal: Negative for falls and myalgias.   Gastrointestinal: Negative for hematochezia, jaundice, melena, nausea and vomiting.   Genitourinary: Negative for hematuria.   Neurological: Negative for dizziness and seizures.   Psychiatric/Behavioral: Negative for altered mental status and memory loss.       No Known Allergies      Current Outpatient Medications:   •  Ascorbic Acid (VITAMIN C PO), Take 1 tablet by mouth Daily., Disp: , Rfl:   •  Cetirizine HCl (ZYRTEC PO), Take 1 tablet by mouth Daily., Disp: , Rfl:   •  chlorthalidone (HYGROTEN) 25 MG tablet, Take 1 tablet by mouth daily., Disp: , Rfl:   •  KLOR-CON 20 MEQ CR tablet, Take 1 tablet by mouth Daily., Disp: , Rfl:   •  levothyroxine (SYNTHROID, LEVOTHROID) 125 MCG tablet, Take 125 mcg by mouth daily., Disp: , Rfl:   •  Lutein 6 MG tablet, Take 1 tablet by mouth Daily., Disp: , Rfl:   •  metoprolol tartrate (LOPRESSOR) 50 MG tablet, Take 1 tablet by mouth Daily., Disp: , Rfl:   •  multivitamin with minerals (VITAMIN D3 COMPLETE PO), Take 1 tablet by mouth Daily., Disp: , Rfl:   •  multivitamin with minerals tablet tablet, Take 1 tablet by mouth Daily., Disp: , Rfl:   •  NIACIN CR PO, Take  by mouth., Disp: , Rfl:   •  Omeprazole 20 MG tablet  "delayed-release, Take 1 tablet by mouth daily., Disp: , Rfl:   •  simvastatin (ZOCOR) 40 MG tablet, , Disp: , Rfl:   •  aspirin 81 MG tablet, Take 1 tablet by mouth daily., Disp: , Rfl:       Objective:     Vitals:    08/10/22 1104   BP: 114/80   Pulse: 53   Weight: 93.6 kg (206 lb 6.4 oz)   Height: 177.8 cm (70\")     Body mass index is 29.62 kg/m².    Constitutional:       Appearance: Well-developed.   Eyes:      General: No scleral icterus.  HENT:      Head: Normocephalic.   Neck:      Thyroid: No thyromegaly.      Vascular: No JVD.      Lymphadenopathy: No cervical adenopathy.   Pulmonary:      Effort: Pulmonary effort is normal.      Breath sounds: Normal breath sounds. No wheezing. No rales.   Cardiovascular:      Normal rate. Regular rhythm.      No gallop.   Edema:     Peripheral edema absent.   Abdominal:      Palpations: Abdomen is soft.      Tenderness: There is no abdominal tenderness.   Musculoskeletal: Normal range of motion. Skin:     General: Skin is warm and dry.      Findings: No rash.   Neurological:      Mental Status: Alert and oriented to person, place, and time.           ECG 12 Lead    Date/Time: 8/10/2022 11:22 AM  Performed by: Yanick Reza MD  Authorized by: Yanick Reza MD   Comparison: compared with previous ECG   Similar to previous ECG  Rhythm: sinus rhythm  Conduction: 1st degree AV block    Clinical impression: abnormal EKG             Assessment:       Diagnosis Plan   1. Bacterial endocarditis, unspecified chronicity     2. Essential hypertension     3. S/P aortic valve replacement            Plan:       He is asymptomatic and doing well I think at this point with his valve now 13 years out he is probably a cayden the least has to get an echo every 2 years when we last looked and there was no evidence of any dysfunction and he has been doing well I Lili set him up to get 1 he will come back and see Merry or Nina in a year and then see me in 2 years    No follow-ups on file. "     As always, it has been a pleasure to participate in your patient's care.      Sincerely,       Yanick Reza MD

## 2022-10-31 ENCOUNTER — AMBULATORY SURGICAL CENTER (OUTPATIENT)
Dept: URBAN - METROPOLITAN AREA SURGERY 20 | Facility: SURGERY | Age: 77
End: 2022-10-31

## 2022-10-31 ENCOUNTER — OFFICE (OUTPATIENT)
Dept: URBAN - METROPOLITAN AREA PATHOLOGY 4 | Facility: PATHOLOGY | Age: 77
End: 2022-10-31
Payer: MEDICARE

## 2022-10-31 DIAGNOSIS — K63.5 POLYP OF COLON: ICD-10-CM

## 2022-10-31 DIAGNOSIS — D12.2 BENIGN NEOPLASM OF ASCENDING COLON: ICD-10-CM

## 2022-10-31 DIAGNOSIS — D12.0 BENIGN NEOPLASM OF CECUM: ICD-10-CM

## 2022-10-31 DIAGNOSIS — K63.89 OTHER SPECIFIED DISEASES OF INTESTINE: ICD-10-CM

## 2022-10-31 DIAGNOSIS — Z86.010 PERSONAL HISTORY OF COLONIC POLYPS: ICD-10-CM

## 2022-10-31 DIAGNOSIS — K64.1 SECOND DEGREE HEMORRHOIDS: ICD-10-CM

## 2022-10-31 DIAGNOSIS — D12.3 BENIGN NEOPLASM OF TRANSVERSE COLON: ICD-10-CM

## 2022-10-31 LAB
GI HISTOLOGY: A. SELECT: (no result)
GI HISTOLOGY: B. SELECT: (no result)
GI HISTOLOGY: C. SELECT: (no result)
GI HISTOLOGY: PDF REPORT: (no result)

## 2022-10-31 PROCEDURE — 45381 COLONOSCOPY SUBMUCOUS NJX: CPT | Mod: PT,59,51 | Performed by: INTERNAL MEDICINE

## 2022-10-31 PROCEDURE — 88305 TISSUE EXAM BY PATHOLOGIST: CPT | Mod: 26 | Performed by: INTERNAL MEDICINE

## 2022-10-31 PROCEDURE — 45381 COLONOSCOPY SUBMUCOUS NJX: CPT | Mod: 51,59,PT | Performed by: INTERNAL MEDICINE

## 2022-10-31 PROCEDURE — 45380 COLONOSCOPY AND BIOPSY: CPT | Mod: 59,PT | Performed by: INTERNAL MEDICINE

## 2022-10-31 PROCEDURE — 45385 COLONOSCOPY W/LESION REMOVAL: CPT | Mod: PT | Performed by: INTERNAL MEDICINE

## 2022-12-14 ENCOUNTER — TRANSCRIBE ORDERS (OUTPATIENT)
Dept: ADMINISTRATIVE | Facility: HOSPITAL | Age: 77
End: 2022-12-14

## 2022-12-14 DIAGNOSIS — R09.89 BRUIT: Primary | ICD-10-CM

## 2022-12-20 ENCOUNTER — HOSPITAL ENCOUNTER (OUTPATIENT)
Dept: CARDIOLOGY | Facility: HOSPITAL | Age: 77
Discharge: HOME | End: 2022-12-20
Admitting: INTERNAL MEDICINE

## 2022-12-20 DIAGNOSIS — R09.89 BRUIT: ICD-10-CM

## 2022-12-20 LAB
BH CV XLRA MEAS LEFT DIST CCA EDV: 26.7 CM/SEC
BH CV XLRA MEAS LEFT DIST CCA PSV: 98.8 CM/SEC
BH CV XLRA MEAS LEFT DIST ICA EDV: 31 CM/SEC
BH CV XLRA MEAS LEFT DIST ICA PSV: 98.8 CM/SEC
BH CV XLRA MEAS LEFT ICA/CCA RATIO: 1
BH CV XLRA MEAS LEFT MID ICA EDV: 32.9 CM/SEC
BH CV XLRA MEAS LEFT MID ICA PSV: 79.5 CM/SEC
BH CV XLRA MEAS LEFT PROX CCA EDV: -23.5 CM/SEC
BH CV XLRA MEAS LEFT PROX CCA PSV: -109 CM/SEC
BH CV XLRA MEAS LEFT PROX ECA EDV: -21.2 CM/SEC
BH CV XLRA MEAS LEFT PROX ECA PSV: -76 CM/SEC
BH CV XLRA MEAS LEFT PROX ICA EDV: -14.3 CM/SEC
BH CV XLRA MEAS LEFT PROX ICA PSV: -65.9 CM/SEC
BH CV XLRA MEAS LEFT PROX SCLA PSV: 145 CM/SEC
BH CV XLRA MEAS LEFT VERTEBRAL A EDV: 15.7 CM/SEC
BH CV XLRA MEAS LEFT VERTEBRAL A PSV: 41.8 CM/SEC
BH CV XLRA MEAS RIGHT DIST CCA EDV: 14 CM/SEC
BH CV XLRA MEAS RIGHT DIST CCA PSV: 51.8 CM/SEC
BH CV XLRA MEAS RIGHT DIST ICA EDV: -23.6 CM/SEC
BH CV XLRA MEAS RIGHT DIST ICA PSV: -72.7 CM/SEC
BH CV XLRA MEAS RIGHT ICA/CCA RATIO: 1.5
BH CV XLRA MEAS RIGHT MID ICA EDV: -26.8 CM/SEC
BH CV XLRA MEAS RIGHT MID ICA PSV: -77.8 CM/SEC
BH CV XLRA MEAS RIGHT PROX CCA EDV: -18 CM/SEC
BH CV XLRA MEAS RIGHT PROX CCA PSV: -76.6 CM/SEC
BH CV XLRA MEAS RIGHT PROX ECA EDV: -14 CM/SEC
BH CV XLRA MEAS RIGHT PROX ECA PSV: -66.3 CM/SEC
BH CV XLRA MEAS RIGHT PROX ICA EDV: -12.9 CM/SEC
BH CV XLRA MEAS RIGHT PROX ICA PSV: -38 CM/SEC
BH CV XLRA MEAS RIGHT PROX SCLA PSV: 139 CM/SEC
BH CV XLRA MEAS RIGHT VERTEBRAL A EDV: -9.6 CM/SEC
BH CV XLRA MEAS RIGHT VERTEBRAL A PSV: -34 CM/SEC
LEFT ARM BP: NORMAL MMHG
MAXIMAL PREDICTED HEART RATE: 145 BPM
RIGHT ARM BP: NORMAL MMHG
STRESS TARGET HR: 123 BPM

## 2022-12-20 PROCEDURE — 93880 EXTRACRANIAL BILAT STUDY: CPT

## 2023-08-23 ENCOUNTER — OFFICE VISIT (OUTPATIENT)
Dept: CARDIOLOGY | Facility: CLINIC | Age: 78
End: 2023-08-23
Payer: MEDICARE

## 2023-08-23 ENCOUNTER — HOSPITAL ENCOUNTER (OUTPATIENT)
Dept: CARDIOLOGY | Facility: HOSPITAL | Age: 78
Discharge: HOME | End: 2023-08-23
Admitting: INTERNAL MEDICINE
Payer: MEDICARE

## 2023-08-23 VITALS
HEART RATE: 52 BPM | SYSTOLIC BLOOD PRESSURE: 134 MMHG | BODY MASS INDEX: 29.49 KG/M2 | DIASTOLIC BLOOD PRESSURE: 90 MMHG | HEIGHT: 70 IN | WEIGHT: 206 LBS

## 2023-08-23 VITALS — HEIGHT: 70 IN | WEIGHT: 206 LBS | BODY MASS INDEX: 29.49 KG/M2

## 2023-08-23 DIAGNOSIS — I10 ESSENTIAL HYPERTENSION: ICD-10-CM

## 2023-08-23 DIAGNOSIS — Z95.2 S/P AORTIC VALVE REPLACEMENT: Primary | ICD-10-CM

## 2023-08-23 DIAGNOSIS — Z95.2 S/P AORTIC VALVE REPLACEMENT: ICD-10-CM

## 2023-08-23 DIAGNOSIS — I33.0 BACTERIAL ENDOCARDITIS, UNSPECIFIED CHRONICITY: ICD-10-CM

## 2023-08-23 LAB
AORTIC ARCH: 2.8 CM
AORTIC DIMENSIONLESS INDEX: 0.3 (DI)
ASCENDING AORTA: 3.1 CM
BH CV ECHO AV AORTIC VALVE AT ACCEL TIME CALCULATED: 111 MSEC
BH CV ECHO MEAS - ACS: 1.74 CM
BH CV ECHO MEAS - AI P1/2T: 682.1 MSEC
BH CV ECHO MEAS - AO MAX PG: 25.8 MMHG
BH CV ECHO MEAS - AO MEAN PG: 13.1 MMHG
BH CV ECHO MEAS - AO ROOT DIAM: 3.1 CM
BH CV ECHO MEAS - AO V2 MAX: 253.9 CM/SEC
BH CV ECHO MEAS - AO V2 VTI: 65.9 CM
BH CV ECHO MEAS - AT: 0.11 SEC
BH CV ECHO MEAS - AVA(I,D): 0.86 CM2
BH CV ECHO MEAS - EDV(CUBED): 110.6 ML
BH CV ECHO MEAS - EDV(MOD-SP2): 134 ML
BH CV ECHO MEAS - EDV(MOD-SP4): 134 ML
BH CV ECHO MEAS - EF(MOD-BP): 66.1 %
BH CV ECHO MEAS - EF(MOD-SP2): 63.4 %
BH CV ECHO MEAS - EF(MOD-SP4): 70.9 %
BH CV ECHO MEAS - ESV(CUBED): 47 ML
BH CV ECHO MEAS - ESV(MOD-SP2): 49 ML
BH CV ECHO MEAS - ESV(MOD-SP4): 39 ML
BH CV ECHO MEAS - FS: 24.8 %
BH CV ECHO MEAS - IVS/LVPW: 1 CM
BH CV ECHO MEAS - IVSD: 1.3 CM
BH CV ECHO MEAS - LAT PEAK E' VEL: 9.2 CM/SEC
BH CV ECHO MEAS - LV DIASTOLIC VOL/BSA (35-75): 63.4 CM2
BH CV ECHO MEAS - LV MASS(C)D: 245.7 GRAMS
BH CV ECHO MEAS - LV MAX PG: 2.38 MMHG
BH CV ECHO MEAS - LV MEAN PG: 1 MMHG
BH CV ECHO MEAS - LV SYSTOLIC VOL/BSA (12-30): 18.5 CM2
BH CV ECHO MEAS - LV V1 MAX: 77.1 CM/SEC
BH CV ECHO MEAS - LV V1 VTI: 18.3 CM
BH CV ECHO MEAS - LVIDD: 4.8 CM
BH CV ECHO MEAS - LVIDS: 3.6 CM
BH CV ECHO MEAS - LVOT AREA: 3.1 CM2
BH CV ECHO MEAS - LVOT DIAM: 1.99 CM
BH CV ECHO MEAS - LVPWD: 1.3 CM
BH CV ECHO MEAS - MED PEAK E' VEL: 5.8 CM/SEC
BH CV ECHO MEAS - MV A DUR: 0.22 SEC
BH CV ECHO MEAS - MV A MAX VEL: 83.6 CM/SEC
BH CV ECHO MEAS - MV DEC SLOPE: 176.5 CM/SEC2
BH CV ECHO MEAS - MV DEC TIME: 0.29 MSEC
BH CV ECHO MEAS - MV E MAX VEL: 69 CM/SEC
BH CV ECHO MEAS - MV E/A: 0.83
BH CV ECHO MEAS - MV MAX PG: 2.9 MMHG
BH CV ECHO MEAS - MV MEAN PG: 1.1 MMHG
BH CV ECHO MEAS - MV P1/2T: 110.4 MSEC
BH CV ECHO MEAS - MV V2 VTI: 26.3 CM
BH CV ECHO MEAS - MVA(P1/2T): 1.99 CM2
BH CV ECHO MEAS - MVA(VTI): 2.17 CM2
BH CV ECHO MEAS - PA V2 MAX: 92.3 CM/SEC
BH CV ECHO MEAS - PULM A REVS DUR: 0.16 SEC
BH CV ECHO MEAS - PULM A REVS VEL: 25.7 CM/SEC
BH CV ECHO MEAS - PULM DIAS VEL: 34.7 CM/SEC
BH CV ECHO MEAS - PULM S/D: 0.99
BH CV ECHO MEAS - PULM SYS VEL: 34.3 CM/SEC
BH CV ECHO MEAS - QP/QS: 0.57
BH CV ECHO MEAS - RAP SYSTOLE: 3 MMHG
BH CV ECHO MEAS - RV MAX PG: 0.47 MMHG
BH CV ECHO MEAS - RV V1 MAX: 34.3 CM/SEC
BH CV ECHO MEAS - RV V1 VTI: 9.3 CM
BH CV ECHO MEAS - RVOT DIAM: 2.11 CM
BH CV ECHO MEAS - RVSP: 34.5 MMHG
BH CV ECHO MEAS - SI(MOD-SP2): 40.2 ML/M2
BH CV ECHO MEAS - SI(MOD-SP4): 44.9 ML/M2
BH CV ECHO MEAS - SV(LVOT): 57 ML
BH CV ECHO MEAS - SV(MOD-SP2): 85 ML
BH CV ECHO MEAS - SV(MOD-SP4): 95 ML
BH CV ECHO MEAS - SV(RVOT): 32.5 ML
BH CV ECHO MEAS - TAPSE (>1.6): 1.99 CM
BH CV ECHO MEAS - TR MAX PG: 31.5 MMHG
BH CV ECHO MEAS - TR MAX VEL: 280.7 CM/SEC
BH CV ECHO MEASUREMENTS AVERAGE E/E' RATIO: 9.2
BH CV XLRA - RV BASE: 3.2 CM
BH CV XLRA - RV LENGTH: 7.7 CM
BH CV XLRA - RV MID: 2.36 CM
BH CV XLRA - TDI S': 7.7 CM/SEC
LEFT ATRIUM VOLUME INDEX: 22.7 ML/M2
SINUS: 3.1 CM

## 2023-08-23 PROCEDURE — 25510000001 PERFLUTREN (DEFINITY) 8.476 MG IN SODIUM CHLORIDE (PF) 0.9 % 10 ML INJECTION: Performed by: INTERNAL MEDICINE

## 2023-08-23 PROCEDURE — 93306 TTE W/DOPPLER COMPLETE: CPT

## 2023-08-23 RX ORDER — ATORVASTATIN CALCIUM 10 MG/1
10 TABLET, FILM COATED ORAL DAILY
COMMUNITY
Start: 2023-08-18

## 2023-08-23 RX ADMIN — PERFLUTREN 2 ML: 6.52 INJECTION, SUSPENSION INTRAVENOUS at 14:11

## 2023-08-23 NOTE — PROGRESS NOTES
Date of Office Visit: 23  Encounter Provider: Yanick Reza MD  Place of Service: [facility]  Patient Name: Kem Giraldo  :1945  [id number]    Chief Complaint   Patient presents with    Bacterial endocarditis     F/U - ECHO   :     HPI: Kem Giraldo is a 77 y.o. male  is a cayden that got endocarditis on his aortic valve from olecranon bursitis in  he has a #25 porcine aortic valve replacement by Dr. Haley no coronary disease preoperatively normal LV function.      He is here for follow-up today and also had an echo today.  He is doing well he still walks 3 to 5 miles every day works out without limitation.  No chest pain no short of breath no PND orthopnea edema he is doing absolutely great    Past Medical History:   Diagnosis Date    Acute renal failure     Acute respiratory failure     Adult respiratory distress syndrome     Aortic valve endocarditis      from elbow bursitis    Endocarditis of mitral valve     Esophageal reflux     Fasciitis     right arm    Hyperlipidemia     Hypertension     Hypothyroidism     Olecranon bursitis     Right    Osteoarthritis     Pericarditis     Postop septic pulmonary embolism     sepsis from traumatic olecranon bursitis, spread to inner arm fasciitis and spinal cord abcess    Pyelonephritis        Past Surgical History:   Procedure Laterality Date    AORTIC VALVE REPAIR/REPLACEMENT      #25 Porcine AVR by Dr Haley at time of BE; normal cors pre-op    CREATION PERICARDIAL WINDOW      DECOMPRESSION FASCIOTOMY FOREARM      GTUBE INSERTION      INCISION AND DRAINAGE ABSCESS POSTERIOR THORACIC SPINE         Social History     Socioeconomic History    Marital status:    Tobacco Use    Smoking status: Never    Smokeless tobacco: Never    Tobacco comments:     caffeine use   Vaping Use    Vaping Use: Never used   Substance and Sexual Activity    Alcohol use: Yes     Alcohol/week: 1.0 standard drink     Types: 1  Cans of beer per week     Comment: social     Drug use: Never    Sexual activity: Not Currently     Comment:        Family History   Problem Relation Age of Onset    Hypertension Mother     Hyperlipidemia Mother     Hypertension Father     Hyperlipidemia Father     Cancer Father        Review of Systems   Constitutional: Negative for decreased appetite, fever, malaise/fatigue and weight loss.   HENT:  Negative for nosebleeds.    Eyes:  Negative for double vision.   Cardiovascular:  Negative for chest pain, claudication, cyanosis, dyspnea on exertion, irregular heartbeat, leg swelling, near-syncope, orthopnea, palpitations, paroxysmal nocturnal dyspnea and syncope.   Respiratory:  Negative for cough, hemoptysis and shortness of breath.    Hematologic/Lymphatic: Negative for bleeding problem.   Skin:  Negative for rash.   Musculoskeletal:  Negative for falls and myalgias.   Gastrointestinal:  Negative for hematochezia, jaundice, melena, nausea and vomiting.   Genitourinary:  Negative for hematuria.   Neurological:  Negative for dizziness and seizures.   Psychiatric/Behavioral:  Negative for altered mental status and memory loss.      No Known Allergies      Current Outpatient Medications:     Ascorbic Acid (VITAMIN C PO), Take 1 tablet by mouth Daily., Disp: , Rfl:     atorvastatin (LIPITOR) 10 MG tablet, Take 1 tablet by mouth Daily., Disp: , Rfl:     Cetirizine HCl (ZYRTEC PO), Take 1 tablet by mouth Daily., Disp: , Rfl:     chlorthalidone (HYGROTEN) 25 MG tablet, Take 1 tablet by mouth Daily., Disp: , Rfl:     KLOR-CON 20 MEQ CR tablet, Take 1 tablet by mouth Daily., Disp: , Rfl:     levothyroxine (SYNTHROID, LEVOTHROID) 125 MCG tablet, Take 1 tablet by mouth Daily., Disp: , Rfl:     Lutein 6 MG tablet, Take 1 tablet by mouth Daily., Disp: , Rfl:     metoprolol tartrate (LOPRESSOR) 50 MG tablet, Take 1 tablet by mouth 2 (Two) Times a Day., Disp: , Rfl:     multivitamin with minerals tablet tablet, Take 1  "tablet by mouth Daily., Disp: , Rfl:     multivitamin with minerals tablet tablet, Take 1 tablet by mouth Daily., Disp: , Rfl:     NIACIN CR PO, Take  by mouth., Disp: , Rfl:     Omeprazole 20 MG tablet delayed-release, Take 20 mg by mouth Daily., Disp: , Rfl:     simvastatin (ZOCOR) 40 MG tablet, , Disp: , Rfl:   No current facility-administered medications for this visit.      Objective:     Vitals:    08/23/23 1349   BP: 134/90   BP Location: Left arm   Patient Position: Sitting   Pulse: 52   Weight: 93.4 kg (206 lb)   Height: 177.8 cm (70\")     Body mass index is 29.56 kg/mý.    Constitutional:       Appearance: Well-developed.   Eyes:      General: No scleral icterus.  HENT:      Head: Normocephalic.   Neck:      Thyroid: No thyromegaly.      Vascular: No JVD.      Lymphadenopathy: No cervical adenopathy.   Pulmonary:      Effort: Pulmonary effort is normal.      Breath sounds: Normal breath sounds. No wheezing. No rales.   Cardiovascular:      Normal rate. Regular rhythm.      No gallop.    Edema:     Peripheral edema absent.   Abdominal:      Palpations: Abdomen is soft.      Tenderness: There is no abdominal tenderness.   Musculoskeletal: Normal range of motion. Skin:     General: Skin is warm and dry.      Findings: No rash.   Neurological:      Mental Status: Alert and oriented to person, place, and time.         ECG 12 Lead    Date/Time: 8/23/2023 2:12 PM  Performed by: Yanick Reza MD  Authorized by: Yanick Reza MD   Rhythm: sinus bradycardia  Ectopy: unifocal PVCs    Clinical impression: abnormal EKG         Assessment:       Diagnosis Plan   1. S/P aortic valve replacement        2. Bacterial endocarditis, unspecified chronicity        3. Essential hypertension                 Plan:       I think he is doing great he is asymptomatic his echo looks good there is a small amount very mild of aortic insufficiency but otherwise the valve looks great we will just keep watching it probably recheck it " in 2 years would not change anything on this cayden    Return for See Nina Khan in 1 year, See me in 2 years.     As always, it has been a pleasure to participate in your patient's care.      Sincerely,       Yanick Reza MD

## 2023-12-11 ENCOUNTER — AMBULATORY SURGICAL CENTER (OUTPATIENT)
Dept: URBAN - METROPOLITAN AREA SURGERY 20 | Facility: SURGERY | Age: 78
End: 2023-12-11

## 2023-12-11 ENCOUNTER — OFFICE (OUTPATIENT)
Dept: URBAN - METROPOLITAN AREA PATHOLOGY 4 | Facility: PATHOLOGY | Age: 78
End: 2023-12-11

## 2023-12-11 DIAGNOSIS — Z09 ENCOUNTER FOR FOLLOW-UP EXAMINATION AFTER COMPLETED TREATMEN: ICD-10-CM

## 2023-12-11 DIAGNOSIS — K63.89 OTHER SPECIFIED DISEASES OF INTESTINE: ICD-10-CM

## 2023-12-11 DIAGNOSIS — D12.3 BENIGN NEOPLASM OF TRANSVERSE COLON: ICD-10-CM

## 2023-12-11 DIAGNOSIS — Z86.010 PERSONAL HISTORY OF COLONIC POLYPS: ICD-10-CM

## 2023-12-11 DIAGNOSIS — D12.4 BENIGN NEOPLASM OF DESCENDING COLON: ICD-10-CM

## 2023-12-11 DIAGNOSIS — D12.0 BENIGN NEOPLASM OF CECUM: ICD-10-CM

## 2023-12-11 DIAGNOSIS — K64.0 FIRST DEGREE HEMORRHOIDS: ICD-10-CM

## 2023-12-11 PROCEDURE — 88305 TISSUE EXAM BY PATHOLOGIST: CPT | Performed by: INTERNAL MEDICINE

## 2023-12-11 PROCEDURE — 45380 COLONOSCOPY AND BIOPSY: CPT | Mod: 59,PT | Performed by: INTERNAL MEDICINE

## 2023-12-11 PROCEDURE — 45385 COLONOSCOPY W/LESION REMOVAL: CPT | Mod: PT | Performed by: INTERNAL MEDICINE

## 2023-12-11 PROCEDURE — 45381 COLONOSCOPY SUBMUCOUS NJX: CPT | Mod: 51,PT | Performed by: INTERNAL MEDICINE

## 2024-08-30 ENCOUNTER — OFFICE VISIT (OUTPATIENT)
Dept: CARDIOLOGY | Facility: CLINIC | Age: 79
End: 2024-08-30
Payer: MEDICARE

## 2024-08-30 VITALS
DIASTOLIC BLOOD PRESSURE: 78 MMHG | HEIGHT: 70 IN | HEART RATE: 69 BPM | WEIGHT: 204.6 LBS | SYSTOLIC BLOOD PRESSURE: 148 MMHG | BODY MASS INDEX: 29.29 KG/M2

## 2024-08-30 DIAGNOSIS — I33.0 BACTERIAL ENDOCARDITIS, UNSPECIFIED CHRONICITY: Primary | ICD-10-CM

## 2024-08-30 DIAGNOSIS — Z95.2 S/P AORTIC VALVE REPLACEMENT: ICD-10-CM

## 2024-08-30 DIAGNOSIS — I10 ESSENTIAL HYPERTENSION: ICD-10-CM

## 2024-08-30 PROCEDURE — 99214 OFFICE O/P EST MOD 30 MIN: CPT | Performed by: INTERNAL MEDICINE

## 2024-08-30 PROCEDURE — 3077F SYST BP >= 140 MM HG: CPT | Performed by: INTERNAL MEDICINE

## 2024-08-30 PROCEDURE — 3078F DIAST BP <80 MM HG: CPT | Performed by: INTERNAL MEDICINE

## 2024-08-30 PROCEDURE — 93000 ELECTROCARDIOGRAM COMPLETE: CPT | Performed by: INTERNAL MEDICINE

## 2024-08-30 RX ORDER — AMOXICILLIN 500 MG/1
2000 CAPSULE ORAL SEE ADMIN INSTRUCTIONS
Qty: 12 CAPSULE | Refills: 1 | Status: SHIPPED | OUTPATIENT
Start: 2024-08-30

## 2024-08-30 NOTE — PROGRESS NOTES
Date of Office Visit: 24  Encounter Provider: Yanick Reza MD  Place of Service: [facility]  Patient Name: Kem Giraldo  :1945  [id number]    Chief Complaint   Patient presents with    Endocarditis   :     HPI: Kem Giraldo is a 78 y.o. male  is a cayden that got endocarditis on his aortic valve from olecranon bursitis in  he has a #25 porcine aortic valve replacement by Dr. Haley no coronary disease preoperatively normal LV function.      He is here for follow-up today and is doing quite well.  Still exercising no chest pain shortness of breath no PND no palpitations    Past Medical History:   Diagnosis Date    Acute renal failure     Acute respiratory failure     Adult respiratory distress syndrome     Aortic valve endocarditis      from elbow bursitis    Endocarditis of mitral valve     Esophageal reflux     Fasciitis     right arm    Hyperlipidemia     Hypertension     Hypothyroidism     Olecranon bursitis     Right    Osteoarthritis     Pericarditis     Postop septic pulmonary embolism     sepsis from traumatic olecranon bursitis, spread to inner arm fasciitis and spinal cord abcess    Pyelonephritis        Past Surgical History:   Procedure Laterality Date    AORTIC VALVE REPAIR/REPLACEMENT      #25 Porcine AVR by Dr Haley at time of BE; normal cors pre-op    CREATION PERICARDIAL WINDOW      DECOMPRESSION FASCIOTOMY FOREARM      GTUBE INSERTION      INCISION AND DRAINAGE ABSCESS POSTERIOR THORACIC SPINE         Social History     Socioeconomic History    Marital status:    Tobacco Use    Smoking status: Never    Smokeless tobacco: Never    Tobacco comments:     caffeine use   Vaping Use    Vaping status: Never Used   Substance and Sexual Activity    Alcohol use: Yes     Alcohol/week: 1.0 standard drink of alcohol     Types: 1 Cans of beer per week     Comment: social     Drug use: Never    Sexual activity: Not Currently     Comment:         Family History   Problem Relation Age of Onset    Hypertension Mother     Hyperlipidemia Mother     Hypertension Father     Hyperlipidemia Father     Cancer Father        Review of Systems   Constitutional: Negative for decreased appetite, fever, malaise/fatigue and weight loss.   HENT:  Negative for nosebleeds.    Eyes:  Negative for double vision.   Cardiovascular:  Negative for chest pain, claudication, cyanosis, dyspnea on exertion, irregular heartbeat, leg swelling, near-syncope, orthopnea, palpitations, paroxysmal nocturnal dyspnea and syncope.   Respiratory:  Negative for cough, hemoptysis and shortness of breath.    Hematologic/Lymphatic: Negative for bleeding problem.   Skin:  Negative for rash.   Musculoskeletal:  Negative for falls and myalgias.   Gastrointestinal:  Negative for hematochezia, jaundice, melena, nausea and vomiting.   Genitourinary:  Negative for hematuria.   Neurological:  Negative for dizziness and seizures.   Psychiatric/Behavioral:  Negative for altered mental status and memory loss.        No Known Allergies      Current Outpatient Medications:     Ascorbic Acid (VITAMIN C PO), Take 1 tablet by mouth Daily., Disp: , Rfl:     atorvastatin (LIPITOR) 20 MG tablet, Take 1 tablet by mouth Daily., Disp: , Rfl:     Cetirizine HCl (ZYRTEC PO), Take 1 tablet by mouth Daily., Disp: , Rfl:     chlorthalidone (HYGROTEN) 25 MG tablet, Take 1 tablet by mouth Daily., Disp: , Rfl:     Cholecalciferol (VITAMIN D3 PO), Take  by mouth., Disp: , Rfl:     KLOR-CON 20 MEQ CR tablet, Take 1 tablet by mouth Daily., Disp: , Rfl:     levothyroxine (SYNTHROID, LEVOTHROID) 125 MCG tablet, Take 1 tablet by mouth Daily., Disp: , Rfl:     Lutein 6 MG tablet, Take 1 tablet by mouth Daily., Disp: , Rfl:     metoprolol tartrate (LOPRESSOR) 50 MG tablet, Take 1 tablet by mouth 2 (Two) Times a Day., Disp: , Rfl:     multivitamin with minerals tablet tablet, Take 1 tablet by mouth Daily., Disp: , Rfl:      "NIACIN CR PO, Take  by mouth., Disp: , Rfl:     Omeprazole 20 MG tablet delayed-release, Take 20 mg by mouth Daily., Disp: , Rfl:     amoxicillin (AMOXIL) 500 MG capsule, Take 4 capsules by mouth See Admin Instructions. 4 capsules 1 hour prior to procedure, Disp: 12 capsule, Rfl: 1      Objective:     Vitals:    08/30/24 1133   BP: 148/78   Pulse: 69   Weight: 92.8 kg (204 lb 9.6 oz)   Height: 177.8 cm (70\")     Body mass index is 29.36 kg/m².    Constitutional:       Appearance: Well-developed.   Eyes:      General: No scleral icterus.  HENT:      Head: Normocephalic.   Neck:      Thyroid: No thyromegaly.      Vascular: No JVD.      Lymphadenopathy: No cervical adenopathy.   Pulmonary:      Effort: Pulmonary effort is normal.      Breath sounds: Normal breath sounds. No wheezing. No rales.   Cardiovascular:      Normal rate. Regular rhythm.      No gallop.    Edema:     Peripheral edema absent.   Abdominal:      Palpations: Abdomen is soft.      Tenderness: There is no abdominal tenderness.   Musculoskeletal: Normal range of motion. Skin:     General: Skin is warm and dry.      Findings: No rash.   Neurological:      Mental Status: Alert and oriented to person, place, and time.           ECG 12 Lead    Date/Time: 8/30/2024 12:12 PM  Performed by: Yanick Reza MD    Authorized by: Yanick Reza MD  Rhythm: sinus rhythm  Ectopy: bigeminy    Clinical impression: abnormal EKG           Assessment:       Diagnosis Plan   1. Bacterial endocarditis, unspecified chronicity        2. S/P aortic valve replacement        3. Essential hypertension                   Plan:       He is doing very well he is asymptomatic functioning at a high level we echoed him last year and it was good he is now on his 16th year after his valve definitely next year we will going to get an echo on him and he somehow has made it this entire time without being on SBE prophylaxis when he goes to the dentist so I am going to correct that  No " follow-ups on file.     As always, it has been a pleasure to participate in your patient's care.      Sincerely,       Yanick Reza MD